# Patient Record
Sex: MALE | URBAN - METROPOLITAN AREA
[De-identification: names, ages, dates, MRNs, and addresses within clinical notes are randomized per-mention and may not be internally consistent; named-entity substitution may affect disease eponyms.]

---

## 2019-10-23 ENCOUNTER — OFFICE VISIT (OUTPATIENT)
Dept: URBAN - METROPOLITAN AREA CLINIC 88 | Facility: CLINIC | Age: 59
End: 2019-10-23
Payer: COMMERCIAL

## 2019-10-23 DIAGNOSIS — H43.811 VITREOUS DEGENERATION, RIGHT EYE: Primary | ICD-10-CM

## 2019-10-23 DIAGNOSIS — H04.123 DRY EYE SYNDROME OF BILATERAL LACRIMAL GLANDS: ICD-10-CM

## 2019-10-23 PROCEDURE — 99203 OFFICE O/P NEW LOW 30 MIN: CPT | Performed by: OPHTHALMOLOGY

## 2019-10-23 ASSESSMENT — INTRAOCULAR PRESSURE
OS: 16
OD: 16

## 2019-10-23 ASSESSMENT — KERATOMETRY
OS: 43.13
OD: 43.00

## 2019-10-23 NOTE — IMPRESSION/PLAN
Impression: Dry eye syndrome of bilateral lacrimal glands: H04.123. OU. Plan: Discussed diagnosis in detail with patient. Explained condition does not have a cure and will need artificial tears for maintenance. Sample of Systane Complete 1 qtt TID OU.

## 2019-10-23 NOTE — IMPRESSION/PLAN
Impression: Vitreous degeneration, right eye: H43.811 OD. Condition: stable. Plan: Discussed diagnosis in detail with patient. There is no evidence of retinal pathology. All signs and risks of retinal detachment and tears were discussed in detail. Patient was advised if any changes of VA, flashes, floaters or if curtain fall in field of [address] [facility]. Patient instructed to call the office immediately if any symptoms noted.

## 2019-12-03 ENCOUNTER — OFFICE VISIT (OUTPATIENT)
Dept: URBAN - METROPOLITAN AREA CLINIC 88 | Facility: CLINIC | Age: 59
End: 2019-12-03
Payer: COMMERCIAL

## 2019-12-03 PROCEDURE — 99213 OFFICE O/P EST LOW 20 MIN: CPT | Performed by: OPHTHALMOLOGY

## 2019-12-03 ASSESSMENT — INTRAOCULAR PRESSURE
OD: 15
OS: 15

## 2019-12-03 NOTE — IMPRESSION/PLAN
Impression: Vitreous degeneration, right eye: H43.811. Condition: established, stable. Symptoms: will continue to monitor. Plan: Discussed signs and symptoms of PVD/floaters. Patient instructed to call the office immediately if any symptoms noted.

## 2019-12-03 NOTE — IMPRESSION/PLAN
Impression: Dry eye syndrome of bilateral lacrimal glands: H04.123 OU. Condition: established, stable. Plan: Discussed diagnosis in detail with patient. Explained condition does not have a cure and will need artificial tears for maintenance.

## 2021-01-12 ENCOUNTER — OFFICE VISIT (OUTPATIENT)
Dept: URBAN - METROPOLITAN AREA CLINIC 32 | Facility: CLINIC | Age: 61
End: 2021-01-12
Payer: COMMERCIAL

## 2021-01-12 DIAGNOSIS — H35.371 PUCKERING OF MACULA OF RIGHT EYE: ICD-10-CM

## 2021-01-12 PROCEDURE — 99204 OFFICE O/P NEW MOD 45 MIN: CPT | Performed by: OPTOMETRIST

## 2021-01-12 PROCEDURE — 92134 CPTRZ OPH DX IMG PST SGM RTA: CPT | Performed by: OPTOMETRIST

## 2021-01-12 ASSESSMENT — KERATOMETRY
OS: 42.88
OD: 43.00

## 2021-01-12 ASSESSMENT — INTRAOCULAR PRESSURE
OD: 14
OS: 13

## 2021-01-12 NOTE — IMPRESSION/PLAN
Impression: Puckering of macula of right eye: H35.371.  Plan: consider retinal referral monitor 6 months

## 2022-12-02 ENCOUNTER — OFFICE VISIT (OUTPATIENT)
Dept: URBAN - METROPOLITAN AREA CLINIC 71 | Facility: CLINIC | Age: 62
End: 2022-12-02
Payer: OTHER GOVERNMENT

## 2022-12-02 DIAGNOSIS — H35.371 PUCKERING OF MACULA OF RIGHT EYE: ICD-10-CM

## 2022-12-02 DIAGNOSIS — H43.811 VITREOUS DEGENERATION, RIGHT EYE: Primary | ICD-10-CM

## 2022-12-02 DIAGNOSIS — H25.13 NUCLEAR SCLEROSIS CATARACT, BILATERAL: ICD-10-CM

## 2022-12-02 PROCEDURE — 92014 COMPRE OPH EXAM EST PT 1/>: CPT | Performed by: OPTOMETRIST

## 2022-12-02 ASSESSMENT — INTRAOCULAR PRESSURE
OD: 15
OS: 15

## 2022-12-02 NOTE — IMPRESSION/PLAN
Impression: Vitreous degeneration, right eye: H43.811. PVD stable OD.  No tears or detachments observed on today's exam. Plan: Continue to monitor yearly with DE.

## 2022-12-02 NOTE — IMPRESSION/PLAN
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 The patient will monitor vision changes and contact us with any decrease in vision. Continue to monitor.

## 2022-12-02 NOTE — IMPRESSION/PLAN
Impression: Puckering of macula of right eye: H35.371. trace ERM OD Plan: No treatment recommended at this time. Continue to monitor.

## 2022-12-05 ENCOUNTER — OFFICE VISIT (OUTPATIENT)
Dept: URBAN - METROPOLITAN AREA CLINIC 71 | Facility: CLINIC | Age: 62
End: 2022-12-05
Payer: COMMERCIAL

## 2022-12-05 DIAGNOSIS — H43.811 VITREOUS DEGENERATION, RIGHT EYE: ICD-10-CM

## 2022-12-05 DIAGNOSIS — H25.13 NUCLEAR SCLEROSIS CATARACT, BILATERAL: ICD-10-CM

## 2022-12-05 DIAGNOSIS — H35.371 PUCKERING OF MACULA OF RIGHT EYE: ICD-10-CM

## 2022-12-05 DIAGNOSIS — H52.4 PRESBYOPIA: Primary | ICD-10-CM

## 2022-12-05 PROCEDURE — 92012 INTRM OPH EXAM EST PATIENT: CPT | Performed by: OPTOMETRIST

## 2022-12-05 ASSESSMENT — INTRAOCULAR PRESSURE
OD: 13
OS: 14

## 2022-12-05 ASSESSMENT — VISUAL ACUITY
OD: 20/20
OS: 20/20

## 2022-12-05 NOTE — IMPRESSION/PLAN
Impression: Vitreous degeneration, right eye: H43.811. PVD stable OD. Pt was not dilated today. Plan: Discussed.  Continue to monitor yearly with DE.

## 2022-12-05 NOTE — IMPRESSION/PLAN
Impression: Presbyopia: H52.4. Plan: A glasses prescription has been discussed and generated. Working distance and discussed with pt, add power set accordingly. Patient to call with any concerns.

## 2023-12-26 ENCOUNTER — OFFICE VISIT (OUTPATIENT)
Dept: URBAN - METROPOLITAN AREA CLINIC 71 | Facility: CLINIC | Age: 63
End: 2023-12-26
Payer: COMMERCIAL

## 2023-12-26 DIAGNOSIS — H52.4 PRESBYOPIA: Primary | ICD-10-CM

## 2023-12-26 DIAGNOSIS — H35.371 PUCKERING OF MACULA OF RIGHT EYE: ICD-10-CM

## 2023-12-26 DIAGNOSIS — H43.811 VITREOUS DEGENERATION, RIGHT EYE: ICD-10-CM

## 2023-12-26 DIAGNOSIS — H25.13 NUCLEAR SCLEROSIS CATARACT, BILATERAL: ICD-10-CM

## 2023-12-26 PROCEDURE — 92012 INTRM OPH EXAM EST PATIENT: CPT | Performed by: OPTOMETRIST

## 2023-12-26 ASSESSMENT — INTRAOCULAR PRESSURE
OS: 11
OD: 10

## 2023-12-26 ASSESSMENT — VISUAL ACUITY
OD: 20/20
OS: 20/20

## 2025-06-20 ENCOUNTER — OFFICE VISIT (OUTPATIENT)
Dept: URBAN - METROPOLITAN AREA CLINIC 71 | Facility: CLINIC | Age: 65
End: 2025-06-20
Payer: COMMERCIAL

## 2025-06-20 DIAGNOSIS — H35.371 PUCKERING OF MACULA OF RIGHT EYE: ICD-10-CM

## 2025-06-20 DIAGNOSIS — H52.4 PRESBYOPIA: ICD-10-CM

## 2025-06-20 DIAGNOSIS — H25.813 COMBINED FORMS OF AGE-RELATED CATARACT, BILATERAL: Primary | ICD-10-CM

## 2025-06-20 PROCEDURE — 99213 OFFICE O/P EST LOW 20 MIN: CPT

## 2025-06-20 ASSESSMENT — INTRAOCULAR PRESSURE
OD: 8
OS: 10

## 2025-06-20 ASSESSMENT — VISUAL ACUITY
OD: 20/20
OS: 20/20

## 2025-07-17 ENCOUNTER — OFFICE VISIT (OUTPATIENT)
Dept: URBAN - METROPOLITAN AREA CLINIC 71 | Facility: CLINIC | Age: 65
End: 2025-07-17
Payer: MEDICARE

## 2025-07-17 DIAGNOSIS — H43.813 VITREOUS DEGENERATION, BILATERAL: Primary | ICD-10-CM

## 2025-07-17 PROCEDURE — 99213 OFFICE O/P EST LOW 20 MIN: CPT

## 2025-07-17 ASSESSMENT — INTRAOCULAR PRESSURE
OD: 8
OS: 8

## 2025-08-21 ENCOUNTER — OFFICE VISIT (OUTPATIENT)
Dept: URBAN - METROPOLITAN AREA CLINIC 71 | Facility: CLINIC | Age: 65
End: 2025-08-21
Payer: MEDICARE

## 2025-08-21 DIAGNOSIS — H43.813 VITREOUS DEGENERATION, BILATERAL: ICD-10-CM

## 2025-08-21 DIAGNOSIS — H25.13 AGE-RELATED NUCLEAR CATARACT, BILATERAL: ICD-10-CM

## 2025-08-21 DIAGNOSIS — H35.371 PUCKERING OF MACULA OF RIGHT EYE: Primary | ICD-10-CM

## 2025-08-21 PROCEDURE — 92134 CPTRZ OPH DX IMG PST SGM RTA: CPT

## 2025-08-21 PROCEDURE — 92133 CPTRZD OPH DX IMG PST SGM ON: CPT

## 2025-08-21 PROCEDURE — 99213 OFFICE O/P EST LOW 20 MIN: CPT

## 2025-08-21 ASSESSMENT — INTRAOCULAR PRESSURE
OD: 12
OS: 10